# Patient Record
Sex: MALE | Race: WHITE
[De-identification: names, ages, dates, MRNs, and addresses within clinical notes are randomized per-mention and may not be internally consistent; named-entity substitution may affect disease eponyms.]

---

## 2023-01-13 ENCOUNTER — HOSPITAL ENCOUNTER (EMERGENCY)
Dept: HOSPITAL 75 - ER | Age: 83
Discharge: HOME | End: 2023-01-13
Payer: MEDICARE

## 2023-01-13 VITALS — SYSTOLIC BLOOD PRESSURE: 134 MMHG | DIASTOLIC BLOOD PRESSURE: 71 MMHG

## 2023-01-13 VITALS — HEIGHT: 67.99 IN | BODY MASS INDEX: 22.72 KG/M2 | WEIGHT: 149.91 LBS

## 2023-01-13 DIAGNOSIS — R07.81: ICD-10-CM
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
## 2023-01-13 DIAGNOSIS — S09.90XA: Primary | ICD-10-CM

## 2023-01-13 DIAGNOSIS — Z28.310: ICD-10-CM

## 2023-01-13 PROCEDURE — 99282 EMERGENCY DEPT VISIT SF MDM: CPT

## 2023-01-13 PROCEDURE — 71046 X-RAY EXAM CHEST 2 VIEWS: CPT

## 2023-01-13 PROCEDURE — 70450 CT HEAD/BRAIN W/O DYE: CPT

## 2023-01-13 NOTE — DIAGNOSTIC IMAGING REPORT
PROCEDURE: CT head without contrast.



TECHNIQUE: Multiple contiguous axial images were obtained through

the brain without the use of intravenous contrast. Auto Exposure

Controls were utilized during the CT exam to meet ALARA standards

for radiation dose reduction. 



INDICATION: Fall with head injury.



No prior studies are available for comparison.



Ventricles and sulci are prominent consistent with cerebral

volume loss. No sulcal effacement or midline shift is identified.

No acute intra-axial or extra-axial hemorrhage is detected.

Cisterns are patent. Visualized paranasal sinuses are clear apart

from mucosal thickening of the ethmoid air cells.



IMPRESSION: No acute intracranial process is detected.



Dictated by: 



  Dictated on workstation # [id number]

## 2023-01-13 NOTE — ED FALL/INJURY
General


Chief Complaint:  Trauma-Non Activation


Stated Complaint:  FALL | LT RIB INJ


Source:  patient


Exam Limitations:  no limitations





History of Present Illness


Date Seen by Provider:  Jan 13, 2023


Time Seen by Provider:  09:35


Initial Comments


81 yo M here after fall from a ladder last night. Has L rib pain currently. 

States was on a low rung, but fell onto left side. Did hit his head, near loss 

of consciousness. He is not on blood thinning medications. Has been ambulatory. 

Mild HA but no changes in vision, weakness numbness or tingling. L rib pain is 

sharp, stabbing and worse with movement, respiration, palpation. Took tylenol 

prior to arrival which seemed to help some.





Allergies and Home Medications


Allergies


Coded Allergies:  


     morphine (Verified  Allergy, Unknown, 1/13/23)





Patient Home Medication List


Home Medication List Reviewed:  Yes





Review of Systems


Review of Systems


Constitutional:  no symptoms reported


Eyes:  No Symptoms Reported


Ears, Nose, Mouth, Throat:  no symptoms reported


Respiratory:  no symptoms reported


Cardiovascular:  other (L rib pain)


Gastrointestinal:  no symptoms reported


Genitourinary:  no symptoms reported


Musculoskeletal:  no symptoms reported


Skin:  no symptoms reported


Psychiatric/Neurological:  No Symptoms Reported





Past Medical-Social-Family Hx


Patient Social History


Tobacco Use?:  No


Use of E-Cig and/or Vaping dev:  No


Substance use?:  No


Alcohol Use?:  No





Family Medical History


Reviewed Nursing Family Hx


No Pertinent Family Hx





Physical Exam


Vital Signs





Vital Signs - First Documented




















Capillary Refill :


Height, Weight, BMI


Height: '"


Weight: lbs. oz. kg;  BMI


Method:


General Appearance:  WD/WN, no apparent distress


HEENT:  normal ENT inspection, pharynx normal


Neck:  non-tender, supple


Cardiovascular:  regular rate, rhythm, no murmur


Respiratory:  lungs clear, normal breath sounds, no respiratory distress, no 

accessory muscle use, other (tenderness to palpation L mid ribs, no crepitus or 

deformity. )


Gastrointestinal:  normal bowel sounds, non tender, soft, no organomegaly


Back:  normal inspection, no vertebral tenderness


Extremities:  normal range of motion, non-tender, normal inspection, no pedal 

edema, no calf tenderness


Neurologic/Psychiatric:  alert, normal mood/affect, oriented x 3


Skin:  normal color, warm/dry


Lymphatic:  no adenopathy





Progress/Results/Core Measures


Results/Orders


My Orders





Orders - JOVITA,ERIN L DO


Chest Pa/Lat (2 View) (1/13/23 09:43)


Ct Head Wo (1/13/23 09:44)





Vital Signs/I&O











 1/13/23 1/13/23





 09:34 09:34


 


Temp 36.4 36.4


 


Pulse 70 70


 


Resp 16 16


 


B/P (MAP) 185/95 (125) 185/95 (125)


 


Pulse Ox 98 


 


O2 Delivery Room Air Room Air











Departure


Communication (Admissions)


Patient is hemodynamically stable.  He has no abnormalities on his chest x-ray. 

Specifically no pneumothorax, obvious rib fractures.  Clinically significant rib

contusions on the left side.  To be provided incentive spirometer, 

recommendations reviewed breathing exercises as well as pain control with 

hydrocodone. I did  him on us of narcotic pain medications at length.  

Advised he may also use anti-inflammatory medicines as well.  CT scan obtained 

because he hit his head pain due to his age and mild headache.  This is 

negative.  No indication of emergent medical condition at this time.  He is 

discharged home in stable condition after questions were sought and answered





Impression





   Primary Impression:  


   Fall


   Qualified Codes:  W19.XXXA - Unspecified fall, initial encounter


   Additional Impressions:  


   Closed head injury


   Qualified Codes:  S09.90XA - Unspecified injury of head, initial encounter


   Rib pain on left side


Disposition:  01 HOME, SELF-CARE


Condition:  Stable





Departure-Patient Inst.


Referrals:  


DONTA ELKINS DO (PCP/Family)


Primary Care Physician


Patient Instructions:  RIB CONTUSION





Add. Discharge Instructions:  


You were seen in the emergency department for left-sided rib pain.  No emergent 

medical conditions identified.  Chest x-ray does not show any obvious evidence 

for broken ribs.  You likely bruised your ribs which can be just as bad.  Please

take the pain medicine as prescribed as needed.  This may make you drowsy so do 

not drive or make important decisions while you are taking it.  Use the 

incentive spirometer multiple times per day to help fully inflate your lungs and

prevent possible pneumonia from not taking deep breaths.  Return to the 

emergency department for any severe concerns.  Follow-up with your primary 

doctor for any nonemergent needs.





All discharge instructions reviewed with patient and/or family. Voiced 

understanding.


Scripts


Hydrocodone Bit/Acetaminophen (HYDROcodone/APAP  5 MG/325 MG TAB) 1 Tab Tab


1 TAB PO Q6H for Pain for 3 Days, #12 TAB


   Prov: ERIN HUSSEIN DO         1/13/23











ERIN HUSSEIN DO            Jan 13, 2023 09:48